# Patient Record
Sex: MALE | Race: WHITE | NOT HISPANIC OR LATINO | URBAN - METROPOLITAN AREA
[De-identification: names, ages, dates, MRNs, and addresses within clinical notes are randomized per-mention and may not be internally consistent; named-entity substitution may affect disease eponyms.]

---

## 2018-03-01 ENCOUNTER — HOSPITAL ENCOUNTER (OUTPATIENT)
Dept: NEUROLOGY | Facility: CLINIC | Age: 73
Setting detail: THERAPIES SERIES
Discharge: STILL ADMITTED OR ADMINISTRATIVE | End: 2018-03-01
Attending: PSYCHIATRY & NEUROLOGY

## 2018-03-01 ENCOUNTER — HOSPITAL ENCOUNTER (OUTPATIENT)
Dept: NEUROLOGY | Facility: CLINIC | Age: 73
Setting detail: THERAPIES SERIES
Discharge: STILL ADMITTED OR ADMINISTRATIVE | End: 2018-03-01
Attending: SOCIAL WORKER

## 2018-03-01 DIAGNOSIS — F25.9 SCHIZOAFFECTIVE DISORDER (H): ICD-10-CM

## 2018-03-01 RX ORDER — TAMSULOSIN HYDROCHLORIDE 0.4 MG/1
0.4 CAPSULE ORAL
Status: SHIPPED | COMMUNITY
Start: 2017-09-08

## 2018-03-01 RX ORDER — CALCIUM POLYCARBOPHIL 625 MG
1 TABLET ORAL
Status: SHIPPED | COMMUNITY
Start: 2012-06-04

## 2018-03-01 RX ORDER — FINASTERIDE 5 MG/1
5 TABLET, FILM COATED ORAL
Status: SHIPPED | COMMUNITY
Start: 2017-07-26

## 2018-03-01 RX ORDER — ALBUTEROL SULFATE 90 UG/1
1-2 AEROSOL, METERED RESPIRATORY (INHALATION)
Status: SHIPPED | COMMUNITY
Start: 2017-04-28

## 2018-03-01 RX ORDER — POLYETHYLENE GLYCOL 3350 17 G/17G
0.5 POWDER, FOR SOLUTION ORAL
Status: SHIPPED | COMMUNITY
Start: 2016-05-25

## 2018-03-01 RX ORDER — CLOZAPINE 100 MG/1
300 TABLET ORAL
Status: SHIPPED | COMMUNITY
Start: 2016-07-13

## 2018-03-01 RX ORDER — ASPIRIN 81 MG/1
81 TABLET, CHEWABLE ORAL
Status: SHIPPED | COMMUNITY
Start: 2018-02-12

## 2018-03-02 ENCOUNTER — AMBULATORY - HEALTHEAST (OUTPATIENT)
Dept: NEUROLOGY | Facility: CLINIC | Age: 73
End: 2018-03-02

## 2021-05-31 VITALS — WEIGHT: 166 LBS

## 2021-06-16 NOTE — PROGRESS NOTES
"Initial Outpatient Psychiatry Consult Note     3/1/2018    Duane W Harvey, a 72 y.o. male, for an initial psychiatric consultation apparently self-referred accompanied by his sister.  There is limited information available but the patient apparently has had a long-standing history of mental health issues and is been at a variety of institutions and apparently was inpatient at [facility] and was provisionally discharged from there.  He currently has psychiatric services through [facility] with Dr. Dunham.  He told the nurse that he was court ordered to follow up there.  He told us that he was here because he wants to be his own payee.  Review of records from [place] shows that the patient was on Clozaril 350 mg a day.    The patient was a vague historian.  When asked what I could do for him he stated \"maybe I really need a  and itself  problem  I need of certification to manage my own funds\".  He went on to state that he believes he would manage his monies better than the [facility] oversight.  The sister agreed that the patient is paying many of his bills he is living independently and she felt the patient would do a better job managing his money.  I asked whether they had brought this concern up to any of his treatment team and they apparently had not.  However the sister later stated that she has been told by the [facility] to not get involved in the patient's treatment team decisions because \"I guess I was too much of a problem\".  The sister had concerns about the length of time the patient stated [facility].  She had concerns about the medications causing the patient to sleep too soundly at night.  Again, I asked whether they had discussed these issues with the patient's current treatment team and they had not.  I did spend quite a bit of time talking with them about what specifically, if anything I could help with.  The patient apparently has had multiple cognitive tests which have " been done over the years.  Those results are not available to me and the characterization by the sister is that they were varied in their assessment.  I did suggest that the concerns that they were having really should be dealt with through the current treatment team members.  If the treatment team does not feel the patient is capable of more freedom or has concerns about that they could further assess or discuss the reasons with the patient.  I am in no position at this point to know whether the patient should be considered for increasing freedoms due to limited information.  I also suggested that if they have concerns about the patient's medications they should bring that up with this prescribing physician.  I spent some time talking with them about the importance of being able to communicate openly with members of the treatment team to allow them the ability to be able to help and address the issues that the patient is concerned about.  I also suggested that since the patient is under court protection that he could offer concerns to the  assigned to his case to advocate for him if appropriate.  I did talk about a possible role of an independent evaluator which may include further cognitive testing and interviews with various treatment team members.  I do not see my role including that at this time.  This visit was apparently prompted by the patient's sister who has been told, by her report to me, that she should not be making treatment decisions or interfering with treatment plans.    The patient reports his mental health symptoms are under good control.  He denies having any psychosis.  He states his mood is good.  No codie.  No significant depressive episodes.  He does not feel hopeless or helpless or worthless.  No racing thoughts.  He states he sleeps well after taking the Clozaril.  He denies change in appetite.  He denies change in cognition.  He does not have any side effects to the medication other  than becoming excessively tired after taking the medication.  The patient denies having any suicidal or homicidal ideation.  He states he will remain compliant with his treatment team recommendations.    Current Medications:  Medications were personally reviewed at this meeting.  Please see the chart for current medication list.         Past medical History:  Patienthas No Known Allergies.   has no past medical history on file.   has no past surgical history on file.  History of elevated PSA  History of myopia both eyes with a stigmatism and presbyopia  History of COPD  History of nicotine dependence  History of an adenomatous polyp of the transverse colon  History of vitamin D deficiency  History of hyperlipidemia  Patient states he had a traumatic brain injury back in 1984 but it was not diagnosed until 2013.  No old records available.  He apparently had a recent CVA.    Past Psychiatric History:  Patient is a vague historian with limited information accompanying the patient.  He apparently has a diagnosis of schizoaffective disorder with a long-standing history of multiple admissions to a variety of settings.  He apparently was at [facility] and is on a provisional discharge from there.  He tells us that he is been court ordered to follow with psychiatry through [facility] and Dr. Dunham.  Recent records from there shows that he is on Clozaril 350 mg a day.    I do see that the patient had a home visit by psychology back in December 2017.  That was that provider's first meeting with the patient.  They report that the patient had a 10 year stay at [facility] for S/P MI.  He was released in 2014.  He appeared pleasant and relatively stable apparently at that visit.  They did offer a CBT type of therapy.    The patient last saw Dr. Alice Dunham in December 2017.  She notes in the chart that the patient had been committed as mentally ill and dangerous by [facility] court at the  "district level in July 1988.  At the time of that commitment he was heavily using alcohol and other drugs and was stalking and harassing a group of high school age girls.  He apparently threatened to abduct them so he could \"talk to them\".  When he was confronted by the police they observed him attempting to load at 357 magnum pistol he kept by his bedside and only dropped the weapon after 2 commands to do so.  He was provisionally discharged from that civil commitment.  At that visit he denied having any breakthrough psychotic symptoms or mood difficulties.  He was on Clozaril 350 mg a day at that time.  I do not see any request or consideration in the notes from that visit for further assessment through this clinic or further neuropsychological testing which was being considered.  They recommended continuation of the medication and current level of care.      Substance Abuse History:   has no tobacco, alcohol, and drug history on file.  Patient apparently has a past history of alcohol use and alcohol dependence which is reportedly in remission.  [facility] record states he has not used alcohol \"for many years\".      Family History:  family history is not on file.  There is no reported family history of mental illness or chemical dependency.      Social History:  Social History     Social History Narrative     These see above.  The patient is not .  He is apparently living independently but under a state of commitment through [facility].  He apparently has a rep payee who is managing his finances.  His sister is involved.             Review Of Systems:  Patient denies having any pain or shortness of breath.  No headaches.  No chest pain.  No shortness of breath.  No GI distress.  No new neurologic findings.  Please see admit ROS.       Mental Status Exam:  Appearance: Patient was alert.  He was calm and polite.  He did not appear to be in any distress but was slightly anxious.  Behavior: Extremely vague.  " He was a poor historian.  It was unclear whether he was intentionally evasive.  No agitation or threatening behavior.  Speech: Simple sentence structure.  Somewhat soft-spoken.  Not pressured or rambling.  Answers were consistent but vague.  Fairly concrete.  Mood/Affect: Flat and slow.  Not obviously depressed however.  No anxiety.  No irritability.  No lability.  Attention and concentration: A bit distractible.  Concentration appears somewhat impaired  Thought Content: No hallucinations or delusions  Suicidal/Homicidal Ideation: None  Thought Process : Slow.  Not loose.  No racing thoughts.  [place] and vague  Insight: Impaired  Judgement: Impaired  Memory: Impaired  Gait: Slow, somewhat wide-based  Orientation: Grossly oriented.      Recent Labs:  No results found for this or any previous visit (from the past 24 hour(s)).      Diagnosis:    Schizoaffective disorder, by history (currently on a release program, apparently provisionally discharged, from [facility] where he was admitted as mentally ill and dangerous back in 1988)  Distant history of alcohol dependence, not recently active by report        Plan:  We will continue to follow up with his primary psychiatrist.  I will not be his care provider at this time.  I suggested he address his concerns listed above to his treatment team including the psychiatrist.  The patient does not appear to be actively suicidal or homicidal.  There is no evidence of any psychosis.  The patient seems to be doing well with his current treatment plan.  The patient will seek out appropriate emergency services should that become necessary.        Thank you for allowing me to participate in the care of this patient.         Rj Spangler MD

## 2021-06-16 NOTE — PROGRESS NOTES
This writer and DECLAN Zayas met with the Patient, Duane W. Harvey.     The Patient is a 72 year old male who was accompanied to the visit today by his sister, Ms. Muñoz. The Patient stated he was here to meet with Dr. Spangler as he, the Patient, needed a doctor to deem him capable, having the capacity to manage his own affairs.     The Patient and his Sister provided the following information: The Patient had been committed to [facility] and was discharged on a provisional release. The Patient was currently living in a duplex that he owned, he lived on one side and rented out the other side. The Patient currently had an [facility] [place]  who was designated his rep. Payee. According to the Patient and his sister the Patient paid some of his own bills and would than get reimbursed by the [facility]. They also shared that there were some bills that the Rep Payee paid directly and felt she was not doing a  good job. Indicating that the Patient had gone 2-3 days without car insurance as the Rep Payee was late in paying his insurance.     The Patient indicated that he currently has a court ordered psychiatrist, Dr. Camille Dunham at [facility].  He also indicated that he currently has a therapist and his last therapy session was on Jan. 4, 2018.     Please refer to Dr. Spangler's Case Consultation Notes from 3/1/18 for more detailed information.    Andria Maddox, Social Work Intern    2/3/18    I have read, discussed, and agree with the documentation as presented by Andria Maddox, Social Work Intern.    DECLAN Zayas  2/3/18

## 2022-03-03 ENCOUNTER — MEDICAL CORRESPONDENCE (OUTPATIENT)
Dept: HEALTH INFORMATION MANAGEMENT | Facility: CLINIC | Age: 77
End: 2022-03-03

## 2022-03-25 ENCOUNTER — MEDICAL CORRESPONDENCE (OUTPATIENT)
Dept: HEALTH INFORMATION MANAGEMENT | Facility: CLINIC | Age: 77
End: 2022-03-25